# Patient Record
Sex: MALE | Race: WHITE | Employment: STUDENT | URBAN - METROPOLITAN AREA
[De-identification: names, ages, dates, MRNs, and addresses within clinical notes are randomized per-mention and may not be internally consistent; named-entity substitution may affect disease eponyms.]

---

## 2017-01-04 ENCOUNTER — OFFICE VISIT (OUTPATIENT)
Dept: INTERNAL MEDICINE CLINIC | Facility: CLINIC | Age: 22
End: 2017-01-04

## 2017-01-04 VITALS
TEMPERATURE: 98 F | BODY MASS INDEX: 26.46 KG/M2 | RESPIRATION RATE: 16 BRPM | HEIGHT: 72 IN | SYSTOLIC BLOOD PRESSURE: 122 MMHG | HEART RATE: 74 BPM | WEIGHT: 195.38 LBS | DIASTOLIC BLOOD PRESSURE: 80 MMHG

## 2017-01-04 DIAGNOSIS — H66.92 LEFT OTITIS MEDIA, UNSPECIFIED CHRONICITY, UNSPECIFIED OTITIS MEDIA TYPE: Primary | ICD-10-CM

## 2017-01-04 PROCEDURE — 99213 OFFICE O/P EST LOW 20 MIN: CPT | Performed by: FAMILY MEDICINE

## 2017-01-04 RX ORDER — SULFAMETHOXAZOLE AND TRIMETHOPRIM 800; 160 MG/1; MG/1
1 TABLET ORAL 2 TIMES DAILY
Qty: 20 TABLET | Refills: 0 | Status: SHIPPED | OUTPATIENT
Start: 2017-01-04 | End: 2017-07-31

## 2017-01-04 NOTE — PROGRESS NOTES
HPI:    Patient ID: Ron Syed is a 22year old male. HPI Here with c/o continued left ear pain despite taking Zpack for ear infection starting 12/21. Notes pain and pressure. No fever. No hearing loss.      Review of Systems   Constitutional: Ne

## 2017-01-04 NOTE — PATIENT INSTRUCTIONS
Anatomy of the Ear    The ear is a complex and delicate organ. It collects sound waves so you can hear the world around you. The ear also has a second function—it helps you keep your balance. Your ear can be divided into 3 parts.  The outer ear and middle

## 2017-06-15 ENCOUNTER — HOSPITAL ENCOUNTER (OUTPATIENT)
Age: 22
Discharge: HOME | End: 2017-06-15
Attending: FAMILY MEDICINE
Payer: COMMERCIAL

## 2017-06-15 VITALS
DIASTOLIC BLOOD PRESSURE: 69 MMHG | SYSTOLIC BLOOD PRESSURE: 106 MMHG | OXYGEN SATURATION: 98 % | HEART RATE: 77 BPM | TEMPERATURE: 98 F | RESPIRATION RATE: 16 BRPM

## 2017-06-15 DIAGNOSIS — H00.021 HORDEOLUM INTERNUM OF RIGHT UPPER EYELID: Primary | ICD-10-CM

## 2017-06-15 PROCEDURE — 99213 OFFICE O/P EST LOW 20 MIN: CPT

## 2017-06-15 PROCEDURE — 99204 OFFICE O/P NEW MOD 45 MIN: CPT

## 2017-06-15 RX ORDER — DOXYCYCLINE HYCLATE 100 MG/1
100 CAPSULE ORAL 2 TIMES DAILY
Qty: 14 CAPSULE | Refills: 0 | Status: SHIPPED | OUTPATIENT
Start: 2017-06-15 | End: 2017-06-22

## 2017-06-15 RX ORDER — ERYTHROMYCIN 5 MG/G
1 OINTMENT OPHTHALMIC NIGHTLY
Qty: 3.5 G | Refills: 0 | Status: SHIPPED | OUTPATIENT
Start: 2017-06-15 | End: 2017-06-20

## 2017-06-15 NOTE — ED PROVIDER NOTES
Patient Seen in: [facility]    History   Patient presents with:  Eyelid Swelling    Stated Complaint: right eyelid swelling x 3 days    HPI    22year-old gentleman with a painful swelling of the right upper lid for 2-3 days now ED Triage Vitals   BP 06/15/17 0812 106/69 mmHg   Pulse 06/15/17 0812 77   Resp 06/15/17 0812 16   Temp 06/15/17 0812 97.8 °F (36.6 °C)   Temp src 06/15/17 0812 Temporal   SpO2 06/15/17 0812 98 %   O2 Device 06/15/17 0812 None (Room air)       Current: [address]  [phone]    Call in 5 days        Medications Prescribed:  Discharge Medication List as of 6/15/2017  8:55 AM    START taking these medications    Doxycycline Hyclate 100 MG Oral Cap  Take 1 capsule (100 mg total) by mouth 2 (two) times daily. ,

## 2017-07-31 ENCOUNTER — OFFICE VISIT (OUTPATIENT)
Dept: FAMILY MEDICINE CLINIC | Facility: CLINIC | Age: 22
End: 2017-07-31

## 2017-07-31 VITALS
HEIGHT: 72 IN | HEART RATE: 66 BPM | DIASTOLIC BLOOD PRESSURE: 70 MMHG | RESPIRATION RATE: 16 BRPM | WEIGHT: 195 LBS | SYSTOLIC BLOOD PRESSURE: 122 MMHG | BODY MASS INDEX: 26.41 KG/M2

## 2017-07-31 DIAGNOSIS — M25.511 ACUTE PAIN OF RIGHT SHOULDER: ICD-10-CM

## 2017-07-31 DIAGNOSIS — Z02.5 SPORTS PHYSICAL: Primary | ICD-10-CM

## 2017-07-31 PROCEDURE — 99385 PREV VISIT NEW AGE 18-39: CPT | Performed by: INTERNAL MEDICINE

## 2017-07-31 NOTE — PROGRESS NOTES
Wellness Exam    REASON FOR VISIT:    Dedrick Page is a 22year old male who presents for an [address].     Current Complaints: r shoulder pain  Flu Shot: declined  Health Maintenance Topics with due status: Overdue       Topic Date Due results found for: FOB, OCCULTSTOOL    Obesity Screening Screen all adults annually Body mass index is 26.45 kg/m².       Preventive Services for Which Recommendations Vary with Risk Recommendation Internal Lab or Procedure External Lab or Procedure   [place] Constitutional: Negative for fever, chills and fatigue. HENT: Negative for hearing loss, congestion, sore throat and neck pain. Eyes: Negative for pain and visual disturbance. Respiratory: Negative for cough and shortness of breath.     Cardiovascu is normal.     ASSESSMENT AND OTHER RELEVANT CHRONIC CONDITIONS:   Neville Harden is a 22year old male who presents for an [address]. No diagnosis found.     PLAN SUMMARY:   Neville Harden is a 22year old male  Age appropriate can 06/11/2002, 06/14/2007       Influenza Annually   Pneumococcal if high risk   Td/Tdap once then every 10 years   HPV Males 11-21   Zoster (Shingles) 60 and older: one dose   Varicella 2 doses if not immune   MMR 1-2 doses if born after 1955 and not immune

## 2018-02-22 ENCOUNTER — LAB ENCOUNTER (OUTPATIENT)
Dept: LAB | Age: 23
End: 2018-02-22
Attending: NURSE PRACTITIONER
Payer: COMMERCIAL

## 2018-02-22 ENCOUNTER — OFFICE VISIT (OUTPATIENT)
Dept: FAMILY MEDICINE CLINIC | Facility: CLINIC | Age: 23
End: 2018-02-22

## 2018-02-22 VITALS
BODY MASS INDEX: 24.64 KG/M2 | TEMPERATURE: 98 F | WEIGHT: 176 LBS | DIASTOLIC BLOOD PRESSURE: 72 MMHG | HEART RATE: 77 BPM | HEIGHT: 70.75 IN | SYSTOLIC BLOOD PRESSURE: 100 MMHG

## 2018-02-22 DIAGNOSIS — R15.2 FECAL URGENCY: ICD-10-CM

## 2018-02-22 DIAGNOSIS — L29.0 RECTAL ITCHING: ICD-10-CM

## 2018-02-22 DIAGNOSIS — K92.1 BLOOD IN STOOL: ICD-10-CM

## 2018-02-22 DIAGNOSIS — K52.9 FREQUENT STOOLS: Primary | ICD-10-CM

## 2018-02-22 DIAGNOSIS — K52.9 FREQUENT STOOLS: ICD-10-CM

## 2018-02-22 DIAGNOSIS — R63.4 WEIGHT LOSS: ICD-10-CM

## 2018-02-22 LAB
ALBUMIN SERPL-MCNC: 4.2 G/DL (ref 3.5–4.8)
ALP LIVER SERPL-CCNC: 66 U/L (ref 45–117)
ALT SERPL-CCNC: 16 U/L (ref 17–63)
AST SERPL-CCNC: 11 U/L (ref 15–41)
BASOPHILS # BLD AUTO: 0.02 X10(3) UL (ref 0–0.1)
BASOPHILS NFR BLD AUTO: 0.4 %
BILIRUB SERPL-MCNC: 0.3 MG/DL (ref 0.1–2)
BUN BLD-MCNC: 19 MG/DL (ref 8–20)
CALCIUM BLD-MCNC: 9.7 MG/DL (ref 8.3–10.3)
CHLORIDE: 100 MMOL/L (ref 101–111)
CO2: 32 MMOL/L (ref 22–32)
CREAT BLD-MCNC: 1.14 MG/DL (ref 0.7–1.3)
EOSINOPHIL # BLD AUTO: 0.08 X10(3) UL (ref 0–0.3)
EOSINOPHIL NFR BLD AUTO: 1.5 %
ERYTHROCYTE [DISTWIDTH] IN BLOOD BY AUTOMATED COUNT: 11.9 % (ref 11.5–16)
GLUCOSE BLD-MCNC: 68 MG/DL (ref 70–99)
HCT VFR BLD AUTO: 44.2 % (ref 37–53)
HGB BLD-MCNC: 14.8 G/DL (ref 13–17)
IMMATURE GRANULOCYTE COUNT: 0.01 X10(3) UL (ref 0–1)
IMMATURE GRANULOCYTE RATIO %: 0.2 %
LYMPHOCYTES # BLD AUTO: 1.32 X10(3) UL (ref 0.9–4)
LYMPHOCYTES NFR BLD AUTO: 24.7 %
M PROTEIN MFR SERPL ELPH: 8.1 G/DL (ref 6.1–8.3)
MCH RBC QN AUTO: 29.6 PG (ref 27–33.2)
MCHC RBC AUTO-ENTMCNC: 33.5 G/DL (ref 31–37)
MCV RBC AUTO: 88.4 FL (ref 80–99)
MONOCYTES # BLD AUTO: 0.41 X10(3) UL (ref 0.1–1)
MONOCYTES NFR BLD AUTO: 7.7 %
NEUTROPHIL ABS PRELIM: 3.51 X10 (3) UL (ref 1.3–6.7)
NEUTROPHILS # BLD AUTO: 3.51 X10(3) UL (ref 1.3–6.7)
NEUTROPHILS NFR BLD AUTO: 65.5 %
PLATELET # BLD AUTO: 216 10(3)UL (ref 150–450)
POTASSIUM SERPL-SCNC: 4.2 MMOL/L (ref 3.6–5.1)
RBC # BLD AUTO: 5 X10(6)UL (ref 4.3–5.7)
RED CELL DISTRIBUTION WIDTH-SD: 37.9 FL (ref 35.1–46.3)
SODIUM SERPL-SCNC: 139 MMOL/L (ref 136–144)
WBC # BLD AUTO: 5.4 X10(3) UL (ref 4–13)

## 2018-02-22 PROCEDURE — 85025 COMPLETE CBC W/AUTO DIFF WBC: CPT

## 2018-02-22 PROCEDURE — 99213 OFFICE O/P EST LOW 20 MIN: CPT | Performed by: NURSE PRACTITIONER

## 2018-02-22 PROCEDURE — 36415 COLL VENOUS BLD VENIPUNCTURE: CPT

## 2018-02-22 PROCEDURE — 80053 COMPREHEN METABOLIC PANEL: CPT

## 2018-02-22 NOTE — PATIENT INSTRUCTIONS
Thank you for choosing [facility] at [place] [id number]  To Do: Yahir Mackenzie  1. Go to the lab for blood work and stool testing (bring back stool into the office)   2.  Referral (placed for internal and external)--> see if you can get in beyond those discussed today.  All therapies have potential risk of harm or side effects or medication interactions.  It is your duty and for your safety to discuss with the pharmacist and our office with questions, and to notify us and stop treatment if p

## 2018-02-22 NOTE — PROGRESS NOTES
[facility] Internal Medicine Office Note  Chief Complaint:   Patient presents with:   Other: frequet bowel movements xcouple of months, possible blood with BM at times  Rectal Problem: Itching    HPI:   This is a 25year old male coming in for  HP for blood in stool and anal bleeding. Negative for nausea, vomiting, abdominal pain, diarrhea and abdominal distention. Genitourinary: Negative for bladder incontinence, urgency and frequency. Musculoskeletal: Negative. Skin: Negative.     Neurologic and all orders for this visit:    Frequent stools  Blood in stool  Fecal urgency  Rectal itching  Weight loss  -     EVALUATE & TREAT, GASTRO (INTERNAL)  -     OP REFERRAL TO GASTROENTEROLOGY  -     STOOL CULTURE W/SHIGATOXIN; Future  -     OVA AND PARASIT or hopeless (over the last two weeks)?: Not at all    PHQ-2 SCORE: 0

## 2018-02-23 ENCOUNTER — LAB ENCOUNTER (OUTPATIENT)
Dept: LAB | Age: 23
End: 2018-02-23
Attending: FAMILY MEDICINE
Payer: COMMERCIAL

## 2018-02-23 DIAGNOSIS — K52.9 FREQUENT STOOLS: ICD-10-CM

## 2018-02-23 PROCEDURE — 87427 SHIGA-LIKE TOXIN AG IA: CPT

## 2018-02-23 PROCEDURE — 87045 FECES CULTURE AEROBIC BACT: CPT

## 2018-02-23 PROCEDURE — 87046 STOOL CULTR AEROBIC BACT EA: CPT

## 2018-02-23 PROCEDURE — 87272 CRYPTOSPORIDIUM AG IF: CPT

## 2018-02-23 PROCEDURE — 87077 CULTURE AEROBIC IDENTIFY: CPT

## 2018-02-23 PROCEDURE — 87209 SMEAR COMPLEX STAIN: CPT

## 2018-02-23 PROCEDURE — 87086 URINE CULTURE/COLONY COUNT: CPT

## 2018-02-23 PROCEDURE — 87338 HPYLORI STOOL AG IA: CPT

## 2018-02-23 PROCEDURE — 87329 GIARDIA AG IA: CPT

## 2018-02-23 PROCEDURE — 87177 OVA AND PARASITES SMEARS: CPT

## 2018-02-24 LAB
CRYPTOSP AG STL QL IA: NEGATIVE
G LAMBLIA AG STL QL IA: NEGATIVE

## 2018-02-25 LAB — HELICOBACTER PYLORI AG, FECAL: NEGATIVE

## 2018-02-26 LAB
OVA AND PARASITE, TRICHROME STAIN: NEGATIVE
OVA AND PARASITE, WET MOUNT: NEGATIVE

## 2021-05-05 ENCOUNTER — TELEPHONE (OUTPATIENT)
Dept: SCHEDULING | Age: 26
End: 2021-05-05

## 2021-10-01 ENCOUNTER — HOSPITAL ENCOUNTER (OUTPATIENT)
Dept: NEUROLOGY | Age: 26
Discharge: HOME | End: 2021-10-01
Attending: PHYSICAL MEDICINE & REHABILITATION

## 2021-10-01 DIAGNOSIS — M54.12 CERVICAL RADICULOPATHY: ICD-10-CM

## 2021-10-01 PROCEDURE — 95913 NRV CNDJ TEST 13/> STUDIES: CPT | Performed by: PSYCHIATRY & NEUROLOGY

## 2021-10-01 PROCEDURE — 95886 MUSC TEST DONE W/N TEST COMP: CPT | Performed by: PSYCHIATRY & NEUROLOGY

## 2021-10-01 PROCEDURE — 95886 MUSC TEST DONE W/N TEST COMP: CPT

## 2021-10-01 PROCEDURE — 95910 NRV CNDJ TEST 7-8 STUDIES: CPT

## (undated) NOTE — ED AVS SNAPSHOT
[facility] in [address] [place] [address] Drive,[address]    [facility]    Phone:  [phone]    Fax:  [address] [place] [address]   MRN: [id number]    Department:  [address] [facility] in [facility]   Date of Visit:  6/15/2017 [address]     Phone:  [phone]    - Doxycycline Hyclate 100 MG Caps  - erythromycin 5 MG/GM Oint            Discharge References/Attachments     STY (ENGLISH)      Disclosure     Insurance plans vary and the physician(s) referred by the Immediate Care may IF THERE IS ANY CHANGE OR WORSENING OF YOUR CONDITION, CALL YOUR PRIMARY CARE PHYSICIAN AT ONCE OR GO TO THE EMERGENCY DEPARTMENT.     If you have been prescribed any medication(s), please fill your prescription right away and begin taking the medication(s) Patient [address] to help you get signed up for insurance coverage. Patient [address] is a Federal Navigator program that can help with your Affordable Care Act coverage, as well as all types of Medicaid plans.   To get signed up and covere